# Patient Record
Sex: FEMALE | Race: BLACK OR AFRICAN AMERICAN | NOT HISPANIC OR LATINO
[De-identification: names, ages, dates, MRNs, and addresses within clinical notes are randomized per-mention and may not be internally consistent; named-entity substitution may affect disease eponyms.]

---

## 2017-04-12 ENCOUNTER — APPOINTMENT (OUTPATIENT)
Dept: PULMONOLOGY | Facility: CLINIC | Age: 77
End: 2017-04-12

## 2017-04-12 VITALS
SYSTOLIC BLOOD PRESSURE: 128 MMHG | HEIGHT: 64.5 IN | WEIGHT: 258 LBS | BODY MASS INDEX: 43.51 KG/M2 | DIASTOLIC BLOOD PRESSURE: 80 MMHG

## 2017-04-12 DIAGNOSIS — K43.9 VENTRAL HERNIA W/OUT OBSTRUCTION OR GANGRENE: ICD-10-CM

## 2017-04-12 RX ORDER — IPRATROPIUM BROMIDE AND ALBUTEROL SULFATE .5; 3 MG/3ML; MG/3ML
2.5-0.5 SOLUTION RESPIRATORY (INHALATION)
Refills: 0 | Status: DISCONTINUED | COMMUNITY
End: 2017-04-12

## 2017-05-17 ENCOUNTER — APPOINTMENT (OUTPATIENT)
Dept: PULMONOLOGY | Facility: CLINIC | Age: 77
End: 2017-05-17

## 2017-05-17 VITALS
DIASTOLIC BLOOD PRESSURE: 74 MMHG | WEIGHT: 260 LBS | BODY MASS INDEX: 43.94 KG/M2 | OXYGEN SATURATION: 93 % | SYSTOLIC BLOOD PRESSURE: 154 MMHG | HEART RATE: 90 BPM

## 2017-08-23 ENCOUNTER — APPOINTMENT (OUTPATIENT)
Dept: PULMONOLOGY | Facility: CLINIC | Age: 77
End: 2017-08-23
Payer: MEDICARE

## 2017-08-23 VITALS — WEIGHT: 262 LBS | BODY MASS INDEX: 44.28 KG/M2

## 2017-08-23 VITALS — HEART RATE: 84 BPM | DIASTOLIC BLOOD PRESSURE: 80 MMHG | SYSTOLIC BLOOD PRESSURE: 120 MMHG

## 2017-08-23 PROCEDURE — 94010 BREATHING CAPACITY TEST: CPT

## 2017-08-23 PROCEDURE — 99214 OFFICE O/P EST MOD 30 MIN: CPT | Mod: 25

## 2017-08-23 RX ORDER — FLUTICASONE FUROATE AND VILANTEROL TRIFENATATE 100; 25 UG/1; UG/1
100-25 POWDER RESPIRATORY (INHALATION) DAILY
Qty: 1 | Refills: 5 | Status: DISCONTINUED | COMMUNITY
Start: 2017-05-17 | End: 2017-08-23

## 2017-08-23 RX ORDER — LEVOFLOXACIN 500 MG/1
500 TABLET, FILM COATED ORAL
Qty: 10 | Refills: 0 | Status: DISCONTINUED | COMMUNITY
Start: 2017-02-22

## 2017-08-23 RX ORDER — PREDNISONE 20 MG/1
20 TABLET ORAL
Qty: 10 | Refills: 0 | Status: DISCONTINUED | COMMUNITY
Start: 2017-02-21

## 2017-11-20 ENCOUNTER — APPOINTMENT (OUTPATIENT)
Dept: PULMONOLOGY | Facility: CLINIC | Age: 77
End: 2017-11-20

## 2017-11-29 ENCOUNTER — APPOINTMENT (OUTPATIENT)
Dept: PULMONOLOGY | Facility: CLINIC | Age: 77
End: 2017-11-29
Payer: MEDICARE

## 2017-11-29 VITALS — BODY MASS INDEX: 43.6 KG/M2 | DIASTOLIC BLOOD PRESSURE: 80 MMHG | SYSTOLIC BLOOD PRESSURE: 126 MMHG | WEIGHT: 258 LBS

## 2017-11-29 PROCEDURE — 99214 OFFICE O/P EST MOD 30 MIN: CPT

## 2017-12-20 ENCOUNTER — APPOINTMENT (OUTPATIENT)
Dept: PULMONOLOGY | Facility: CLINIC | Age: 77
End: 2017-12-20
Payer: MEDICARE

## 2017-12-20 VITALS — WEIGHT: 258 LBS | BODY MASS INDEX: 43.6 KG/M2

## 2017-12-20 VITALS
DIASTOLIC BLOOD PRESSURE: 76 MMHG | BODY MASS INDEX: 43.6 KG/M2 | OXYGEN SATURATION: 98 % | HEART RATE: 76 BPM | HEIGHT: 64.5 IN | SYSTOLIC BLOOD PRESSURE: 130 MMHG

## 2017-12-20 PROCEDURE — 94010 BREATHING CAPACITY TEST: CPT

## 2017-12-20 PROCEDURE — 99214 OFFICE O/P EST MOD 30 MIN: CPT | Mod: 25

## 2018-04-20 ENCOUNTER — APPOINTMENT (OUTPATIENT)
Dept: PULMONOLOGY | Facility: CLINIC | Age: 78
End: 2018-04-20

## 2018-06-19 PROBLEM — M79.672 LEFT FOOT PAIN: Status: ACTIVE | Noted: 2018-06-19

## 2018-06-22 ENCOUNTER — APPOINTMENT (OUTPATIENT)
Dept: ORTHOPEDIC SURGERY | Facility: CLINIC | Age: 78
End: 2018-06-22
Payer: MEDICARE

## 2018-06-22 VITALS
HEART RATE: 74 BPM | BODY MASS INDEX: 40.47 KG/M2 | DIASTOLIC BLOOD PRESSURE: 77 MMHG | WEIGHT: 240 LBS | HEIGHT: 64.5 IN | SYSTOLIC BLOOD PRESSURE: 129 MMHG

## 2018-06-22 DIAGNOSIS — M79.672 PAIN IN LEFT FOOT: ICD-10-CM

## 2018-06-22 PROCEDURE — 20605 DRAIN/INJ JOINT/BURSA W/O US: CPT | Mod: LT

## 2018-06-22 PROCEDURE — 73610 X-RAY EXAM OF ANKLE: CPT | Mod: LT

## 2018-06-22 PROCEDURE — 73630 X-RAY EXAM OF FOOT: CPT | Mod: LT

## 2018-06-22 PROCEDURE — 99204 OFFICE O/P NEW MOD 45 MIN: CPT | Mod: 25

## 2018-07-20 ENCOUNTER — APPOINTMENT (OUTPATIENT)
Dept: ORTHOPEDIC SURGERY | Facility: CLINIC | Age: 78
End: 2018-07-20
Payer: MEDICARE

## 2018-07-20 VITALS
DIASTOLIC BLOOD PRESSURE: 81 MMHG | HEART RATE: 73 BPM | BODY MASS INDEX: 40.47 KG/M2 | WEIGHT: 240 LBS | SYSTOLIC BLOOD PRESSURE: 135 MMHG | HEIGHT: 64.5 IN

## 2018-07-20 PROCEDURE — 99213 OFFICE O/P EST LOW 20 MIN: CPT | Mod: 25

## 2018-07-20 PROCEDURE — 20605 DRAIN/INJ JOINT/BURSA W/O US: CPT | Mod: LT

## 2018-07-20 PROCEDURE — 73610 X-RAY EXAM OF ANKLE: CPT | Mod: LT

## 2018-08-03 ENCOUNTER — APPOINTMENT (OUTPATIENT)
Dept: ORTHOPEDIC SURGERY | Facility: CLINIC | Age: 78
End: 2018-08-03

## 2018-08-17 DIAGNOSIS — Z83.511 FAMILY HISTORY OF GLAUCOMA: ICD-10-CM

## 2018-08-17 DIAGNOSIS — Z82.49 FAMILY HISTORY OF ISCHEMIC HEART DISEASE AND OTHER DISEASES OF THE CIRCULATORY SYSTEM: ICD-10-CM

## 2018-08-17 DIAGNOSIS — Z63.4 DISAPPEARANCE AND DEATH OF FAMILY MEMBER: ICD-10-CM

## 2018-08-17 DIAGNOSIS — Z87.59 PERSONAL HISTORY OF OTHER COMPLICATIONS OF PREGNANCY, CHILDBIRTH AND THE PUERPERIUM: ICD-10-CM

## 2018-08-17 DIAGNOSIS — Z80.42 FAMILY HISTORY OF MALIGNANT NEOPLASM OF PROSTATE: ICD-10-CM

## 2018-08-17 DIAGNOSIS — Z80.0 FAMILY HISTORY OF MALIGNANT NEOPLASM OF DIGESTIVE ORGANS: ICD-10-CM

## 2018-08-17 DIAGNOSIS — Z78.9 OTHER SPECIFIED HEALTH STATUS: ICD-10-CM

## 2018-08-17 DIAGNOSIS — Z80.49 FAMILY HISTORY OF MALIGNANT NEOPLASM OF OTHER GENITAL ORGANS: ICD-10-CM

## 2018-08-17 SDOH — SOCIAL STABILITY - SOCIAL INSECURITY: DISSAPEARANCE AND DEATH OF FAMILY MEMBER: Z63.4

## 2018-08-24 PROBLEM — Z80.0 FAMILY HISTORY OF COLON CANCER: Status: ACTIVE | Noted: 2018-08-24

## 2018-08-24 PROBLEM — Z83.511 FAMILY HISTORY OF GLAUCOMA: Status: ACTIVE | Noted: 2018-08-24

## 2018-08-24 PROBLEM — Z82.49 FAMILY HISTORY OF HYPERTENSION: Status: ACTIVE | Noted: 2018-08-24

## 2018-08-24 PROBLEM — Z80.49 FAMILY HISTORY OF MALIGNANT NEOPLASM OF UTERUS: Status: ACTIVE | Noted: 2018-08-24

## 2018-08-24 PROBLEM — Z63.4 WIDOWED: Status: ACTIVE | Noted: 2018-08-24

## 2018-08-24 PROBLEM — Z80.0 FAMILY HISTORY OF MALIGNANT NEOPLASM OF STOMACH: Status: ACTIVE | Noted: 2018-08-24

## 2018-08-24 PROBLEM — Z80.42 FAMILY HISTORY OF MALIGNANT NEOPLASM OF PROSTATE: Status: ACTIVE | Noted: 2018-08-24

## 2018-08-24 PROBLEM — Z78.9 DOES NOT USE TOBACCO: Status: ACTIVE | Noted: 2018-08-24

## 2018-08-24 PROBLEM — Z87.59 HISTORY OF SPONTANEOUS ABORTION: Status: RESOLVED | Noted: 2018-08-24 | Resolved: 2018-08-24

## 2018-08-24 LAB — HBA1C MFR BLD: 6

## 2018-09-12 ENCOUNTER — APPOINTMENT (OUTPATIENT)
Dept: PULMONOLOGY | Facility: CLINIC | Age: 78
End: 2018-09-12
Payer: MEDICARE

## 2018-09-12 VITALS
HEIGHT: 64.5 IN | SYSTOLIC BLOOD PRESSURE: 140 MMHG | DIASTOLIC BLOOD PRESSURE: 86 MMHG | OXYGEN SATURATION: 95 % | HEART RATE: 78 BPM | BODY MASS INDEX: 41.82 KG/M2 | WEIGHT: 248 LBS

## 2018-09-12 DIAGNOSIS — R06.2 WHEEZING: ICD-10-CM

## 2018-09-12 PROCEDURE — 99214 OFFICE O/P EST MOD 30 MIN: CPT

## 2018-09-13 ENCOUNTER — APPOINTMENT (OUTPATIENT)
Dept: ENDOCRINOLOGY | Facility: CLINIC | Age: 78
End: 2018-09-13
Payer: MEDICARE

## 2018-09-13 VITALS
HEART RATE: 83 BPM | SYSTOLIC BLOOD PRESSURE: 130 MMHG | HEIGHT: 64.5 IN | WEIGHT: 243 LBS | DIASTOLIC BLOOD PRESSURE: 80 MMHG | BODY MASS INDEX: 40.98 KG/M2

## 2018-09-13 DIAGNOSIS — H40.9 UNSPECIFIED GLAUCOMA: ICD-10-CM

## 2018-09-13 LAB
CHOLEST SERPL-MCNC: 192
GLUCOSE BLDC GLUCOMTR-MCNC: 124
HBA1C MFR BLD HPLC: 6.6
HDLC SERPL-MCNC: 67
LDLC SERPL CALC-MCNC: 113
MICROALBUMIN/CREAT 24H UR-RTO: 0
TRIGL SERPL-MCNC: 61

## 2018-09-13 PROCEDURE — 82962 GLUCOSE BLOOD TEST: CPT

## 2018-09-13 PROCEDURE — 99213 OFFICE O/P EST LOW 20 MIN: CPT | Mod: 25

## 2018-09-13 RX ORDER — PANTOPRAZOLE 40 MG/1
40 TABLET, DELAYED RELEASE ORAL
Refills: 0 | Status: DISCONTINUED | COMMUNITY
End: 2018-09-13

## 2018-09-13 RX ORDER — METHYLPREDNISOLONE 4 MG/1
4 TABLET ORAL
Qty: 21 | Refills: 0 | Status: DISCONTINUED | COMMUNITY
Start: 2017-11-29 | End: 2018-09-13

## 2018-09-14 ENCOUNTER — APPOINTMENT (OUTPATIENT)
Dept: PULMONOLOGY | Facility: CLINIC | Age: 78
End: 2018-09-14

## 2018-09-18 ENCOUNTER — FORM ENCOUNTER (OUTPATIENT)
Age: 78
End: 2018-09-18

## 2018-09-19 ENCOUNTER — OUTPATIENT (OUTPATIENT)
Dept: OUTPATIENT SERVICES | Facility: HOSPITAL | Age: 78
LOS: 1 days | End: 2018-09-19

## 2018-09-19 ENCOUNTER — APPOINTMENT (OUTPATIENT)
Dept: MRI IMAGING | Facility: CLINIC | Age: 78
End: 2018-09-19
Payer: MEDICARE

## 2018-09-19 DIAGNOSIS — E27.9 DISORDER OF ADRENAL GLAND, UNSPECIFIED: ICD-10-CM

## 2018-09-19 DIAGNOSIS — Z98.84 BARIATRIC SURGERY STATUS: Chronic | ICD-10-CM

## 2018-09-19 PROBLEM — H40.9 GLAUCOMA: Status: ACTIVE | Noted: 2018-09-19

## 2018-09-19 PROCEDURE — 74183 MRI ABD W/O CNTR FLWD CNTR: CPT | Mod: 26

## 2018-09-19 RX ORDER — CELECOXIB 200 MG/1
200 CAPSULE ORAL
Refills: 0 | Status: DISCONTINUED | COMMUNITY
End: 2018-09-19

## 2018-09-20 ENCOUNTER — MEDICATION RENEWAL (OUTPATIENT)
Age: 78
End: 2018-09-20

## 2018-10-23 ENCOUNTER — OUTPATIENT (OUTPATIENT)
Dept: OUTPATIENT SERVICES | Facility: HOSPITAL | Age: 78
LOS: 1 days | End: 2018-10-23
Payer: MEDICARE

## 2018-10-23 DIAGNOSIS — R49.0 DYSPHONIA: ICD-10-CM

## 2018-10-23 DIAGNOSIS — Z51.89 ENCOUNTER FOR OTHER SPECIFIED AFTERCARE: ICD-10-CM

## 2018-10-23 DIAGNOSIS — Z98.84 BARIATRIC SURGERY STATUS: Chronic | ICD-10-CM

## 2018-10-23 PROCEDURE — G9171: CPT | Mod: CH

## 2018-10-23 PROCEDURE — G9172: CPT | Mod: CH

## 2018-10-23 PROCEDURE — 92523 SPEECH SOUND LANG COMPREHEN: CPT | Mod: KX

## 2018-11-13 ENCOUNTER — OTHER (OUTPATIENT)
Age: 78
End: 2018-11-13

## 2018-11-13 LAB
ALDOSTERONE SERUM: 7.1 NG/DL
RENIN ACTIVITY, PLASMA: <0.167 NG/ML/HR

## 2018-12-18 RX ORDER — OMEPRAZOLE 40 MG/1
40 CAPSULE, DELAYED RELEASE ORAL
Qty: 90 | Refills: 3 | Status: DISCONTINUED | COMMUNITY
Start: 2017-04-12 | End: 2018-12-18

## 2018-12-21 LAB
ALBUMIN SERPL ELPH-MCNC: 4.2 G/DL
ALP BLD-CCNC: 94 U/L
ALT SERPL-CCNC: 13 U/L
ANION GAP SERPL CALC-SCNC: 13 MMOL/L
AST SERPL-CCNC: 17 U/L
BASOPHILS # BLD AUTO: 0.03 K/UL
BASOPHILS NFR BLD AUTO: 0.4 %
BILIRUB SERPL-MCNC: 0.6 MG/DL
BUN SERPL-MCNC: 16 MG/DL
CALCIUM SERPL-MCNC: 9.4 MG/DL
CHLORIDE SERPL-SCNC: 105 MMOL/L
CHOLEST SERPL-MCNC: 192 MG/DL
CHOLEST/HDLC SERPL: 2.4 RATIO
CO2 SERPL-SCNC: 26 MMOL/L
CREAT SERPL-MCNC: 0.67 MG/DL
EOSINOPHIL # BLD AUTO: 0.25 K/UL
EOSINOPHIL NFR BLD AUTO: 3.5 %
GLUCOSE SERPL-MCNC: 116 MG/DL
HBA1C MFR BLD HPLC: 6.5 %
HCT VFR BLD CALC: 39.8 %
HDLC SERPL-MCNC: 79 MG/DL
HGB BLD-MCNC: 12.3 G/DL
IMM GRANULOCYTES NFR BLD AUTO: 0.3 %
LDLC SERPL CALC-MCNC: 98 MG/DL
LYMPHOCYTES # BLD AUTO: 3.13 K/UL
LYMPHOCYTES NFR BLD AUTO: 43.4 %
MAN DIFF?: NORMAL
MCHC RBC-ENTMCNC: 29 PG
MCHC RBC-ENTMCNC: 30.9 GM/DL
MCV RBC AUTO: 93.9 FL
MONOCYTES # BLD AUTO: 0.5 K/UL
MONOCYTES NFR BLD AUTO: 6.9 %
NEUTROPHILS # BLD AUTO: 3.28 K/UL
NEUTROPHILS NFR BLD AUTO: 45.5 %
PLATELET # BLD AUTO: 275 K/UL
POTASSIUM SERPL-SCNC: 4.4 MMOL/L
PROT SERPL-MCNC: 6.7 G/DL
RBC # BLD: 4.24 M/UL
RBC # FLD: 15.7 %
SODIUM SERPL-SCNC: 144 MMOL/L
TRIGL SERPL-MCNC: 73 MG/DL
WBC # FLD AUTO: 7.21 K/UL

## 2019-01-25 ENCOUNTER — APPOINTMENT (OUTPATIENT)
Dept: ORTHOPEDIC SURGERY | Facility: CLINIC | Age: 79
End: 2019-01-25

## 2019-02-13 ENCOUNTER — APPOINTMENT (OUTPATIENT)
Dept: ENDOCRINOLOGY | Facility: CLINIC | Age: 79
End: 2019-02-13
Payer: MEDICARE

## 2019-02-13 VITALS
HEIGHT: 64.5 IN | SYSTOLIC BLOOD PRESSURE: 132 MMHG | DIASTOLIC BLOOD PRESSURE: 70 MMHG | BODY MASS INDEX: 40.14 KG/M2 | WEIGHT: 238 LBS | HEART RATE: 72 BPM

## 2019-02-13 LAB — GLUCOSE BLDC GLUCOMTR-MCNC: 148

## 2019-02-13 PROCEDURE — 99214 OFFICE O/P EST MOD 30 MIN: CPT | Mod: 25

## 2019-02-13 PROCEDURE — 82962 GLUCOSE BLOOD TEST: CPT

## 2019-02-13 RX ORDER — BLOOD-GLUCOSE METER
W/DEVICE KIT MISCELLANEOUS
Qty: 1 | Refills: 0 | Status: ACTIVE | COMMUNITY
Start: 2019-02-13 | End: 1900-01-01

## 2019-03-15 ENCOUNTER — APPOINTMENT (OUTPATIENT)
Dept: PULMONOLOGY | Facility: CLINIC | Age: 79
End: 2019-03-15

## 2019-03-27 ENCOUNTER — APPOINTMENT (OUTPATIENT)
Dept: ENDOCRINOLOGY | Facility: CLINIC | Age: 79
End: 2019-03-27

## 2019-05-01 RX ORDER — RABEPRAZOLE SODIUM 20 MG/1
20 TABLET, DELAYED RELEASE ORAL DAILY
Qty: 90 | Refills: 1 | Status: DISCONTINUED | COMMUNITY
Start: 2018-12-18 | End: 2019-05-01

## 2019-05-06 ENCOUNTER — APPOINTMENT (OUTPATIENT)
Dept: ENDOCRINOLOGY | Facility: CLINIC | Age: 79
End: 2019-05-06
Payer: MEDICARE

## 2019-05-06 PROCEDURE — 36415 COLL VENOUS BLD VENIPUNCTURE: CPT

## 2019-05-09 ENCOUNTER — RECORD ABSTRACTING (OUTPATIENT)
Age: 79
End: 2019-05-09

## 2019-05-09 LAB
ALBUMIN SERPL ELPH-MCNC: 4 G/DL
ALP BLD-CCNC: 84 U/L
ALT SERPL-CCNC: 10 U/L
ANION GAP SERPL CALC-SCNC: 13 MMOL/L
AST SERPL-CCNC: 16 U/L
BILIRUB SERPL-MCNC: 0.4 MG/DL
BUN SERPL-MCNC: 16 MG/DL
CALCIUM SERPL-MCNC: 9.6 MG/DL
CHLORIDE SERPL-SCNC: 104 MMOL/L
CHOLEST SERPL-MCNC: 178 MG/DL
CHOLEST/HDLC SERPL: 3 RATIO
CO2 SERPL-SCNC: 26 MMOL/L
CREAT SERPL-MCNC: 0.76 MG/DL
GLUCOSE SERPL-MCNC: 105 MG/DL
HBA1C MFR BLD HPLC: 6.4 %
HDLC SERPL-MCNC: 60 MG/DL
LDLC SERPL CALC-MCNC: 105 MG/DL
POTASSIUM SERPL-SCNC: 4.3 MMOL/L
PROT SERPL-MCNC: 6.6 G/DL
SODIUM SERPL-SCNC: 143 MMOL/L
TRIGL SERPL-MCNC: 63 MG/DL

## 2019-05-29 ENCOUNTER — APPOINTMENT (OUTPATIENT)
Dept: ENDOCRINOLOGY | Facility: CLINIC | Age: 79
End: 2019-05-29
Payer: MEDICARE

## 2019-05-29 VITALS
BODY MASS INDEX: 39.12 KG/M2 | SYSTOLIC BLOOD PRESSURE: 124 MMHG | DIASTOLIC BLOOD PRESSURE: 80 MMHG | HEIGHT: 64.5 IN | WEIGHT: 232 LBS | HEART RATE: 63 BPM

## 2019-05-29 DIAGNOSIS — E27.9 DISORDER OF ADRENAL GLAND, UNSPECIFIED: ICD-10-CM

## 2019-05-29 DIAGNOSIS — R73.03 PREDIABETES.: ICD-10-CM

## 2019-05-29 DIAGNOSIS — R73.09 OTHER ABNORMAL GLUCOSE: ICD-10-CM

## 2019-05-29 LAB — GLUCOSE BLDC GLUCOMTR-MCNC: 127

## 2019-05-29 PROCEDURE — 82962 GLUCOSE BLOOD TEST: CPT

## 2019-05-29 PROCEDURE — 99213 OFFICE O/P EST LOW 20 MIN: CPT | Mod: 25

## 2019-06-12 ENCOUNTER — APPOINTMENT (OUTPATIENT)
Dept: NEUROLOGY | Facility: CLINIC | Age: 79
End: 2019-06-12
Payer: MEDICARE

## 2019-06-12 VITALS
HEIGHT: 68 IN | BODY MASS INDEX: 34.71 KG/M2 | SYSTOLIC BLOOD PRESSURE: 123 MMHG | DIASTOLIC BLOOD PRESSURE: 80 MMHG | WEIGHT: 229 LBS

## 2019-06-12 DIAGNOSIS — R55 SYNCOPE AND COLLAPSE: ICD-10-CM

## 2019-06-12 DIAGNOSIS — R56.9 UNSPECIFIED CONVULSIONS: ICD-10-CM

## 2019-06-12 DIAGNOSIS — R42 DIZZINESS AND GIDDINESS: ICD-10-CM

## 2019-06-12 PROCEDURE — 99204 OFFICE O/P NEW MOD 45 MIN: CPT

## 2019-06-12 NOTE — REASON FOR VISIT
[Consultation] : a consultation visit [FreeTextEntry1] : Chief complaint: Lightheadedness, Dr. MICHELLE Liu

## 2019-06-12 NOTE — CONSULT LETTER
[Dear  ___] : Dear  [unfilled], [Consult Letter:] : I had the pleasure of evaluating your patient, [unfilled]. [Sincerely,] : Sincerely, [FreeTextEntry3] : Shiv Zelaya MD, PhD, DPN-N\par [facility]\par Neurology at [place]\par Chief, Division of Neurology\par [facility]\par  [Please see my note below.] : Please see my note below.

## 2019-06-12 NOTE — HISTORY OF PRESENT ILLNESS
[FreeTextEntry1] : She has been following with a another neurologist. She says [place] in [place] to travel. She had some sort of blackouts in the 1990s. She was diagnosed with seizures. She was on medication for a while but has been off medication for many years and says she has not had any of those spells. She has also had episodes of vertigo since 2000. Over the last 1-2 months she developed a new sensation. She reports a hot feeling in her head. She would feel weak and lightheaded. She says she gets it daily. She does follow with her cardiologist and she says everything has been fine in that regard.\par \par Says she has had negative brain MRIs in the past.\par \par Medical history otherwise significant for diabetes

## 2019-06-12 NOTE — ASSESSMENT
[FreeTextEntry1] : Her neurological examination is unremarkable. She is describing mainly a presyncopal-type of feeling. She says cardiac evaluation has been unremarkable. We are requested the records from her prior treating neurologist. Will also obtain an EEG and cerebrovascular flow studies with further discussions to follow.

## 2019-06-12 NOTE — PHYSICAL EXAM
[General Appearance - Alert] : alert [General Appearance - Well-Appearing] : healthy appearing [Oriented To Time, Place, And Person] : oriented to person, place, and time [General Appearance - In No Acute Distress] : in no acute distress [Impaired Insight] : insight and judgment were intact [Affect] : the affect was normal [Memory Recent] : recent memory was not impaired [Place] : oriented to place [Person] : oriented to person [Concentration Intact] : normal concentrating ability [Time] : oriented to time [Comprehension] : comprehension intact [Fluency] : fluency intact [Cranial Nerves Optic (II)] : visual acuity intact bilaterally,  visual fields full to confrontation, pupils equal round and reactive to light [Cranial Nerves Facial (VII)] : face symmetrical [Cranial Nerves Trigeminal (V)] : facial sensation intact symmetrically [Cranial Nerves Oculomotor (III)] : extraocular motion intact [Cranial Nerves Vestibulocochlear (VIII)] : hearing was intact bilaterally [Cranial Nerves Accessory (XI - Cranial And Spinal)] : head turning and shoulder shrug symmetric [Cranial Nerves Glossopharyngeal (IX)] : tongue and palate midline [Cranial Nerves Hypoglossal (XII)] : there was no tongue deviation with protrusion [Motor Strength] : muscle strength was normal in all four extremities [Motor Tone] : muscle tone was normal in all four extremities [Sensation Pain / Temperature Decrease] : pain and temperature was intact [Sensation Tactile Decrease] : light touch was intact [No Muscle Atrophy] : normal bulk in all four extremities [Balance] : balance was intact [2+] : Ankle jerk left 2+ [Arterial Pulses Carotid] : carotid pulses were normal with no bruits [Edema] : there was no peripheral edema [FreeTextEntry1] : hallpike maneuver neg [Past-pointing] : there was no past-pointing [Tremor] : no tremor present [Coordination - Dysmetria Impaired Finger-to-Nose Bilateral] : not present [Coordination - Dysmetria Impaired Heel-to-Shin Bilateral] : not present [Plantar Reflex Right Only] : normal on the right [Plantar Reflex Left Only] : normal on the left [FreeTextEntry8] : Ambulates with a cane due to chronic ankle pain. [FreeTextEntry5] : No nystagmus

## 2019-06-18 ENCOUNTER — APPOINTMENT (OUTPATIENT)
Dept: NEUROLOGY | Facility: CLINIC | Age: 79
End: 2019-06-18

## 2019-07-08 ENCOUNTER — LABORATORY RESULT (OUTPATIENT)
Age: 79
End: 2019-07-08

## 2019-08-01 ENCOUNTER — LABORATORY RESULT (OUTPATIENT)
Age: 79
End: 2019-08-01

## 2019-08-05 ENCOUNTER — RX RENEWAL (OUTPATIENT)
Age: 79
End: 2019-08-05

## 2019-08-05 PROBLEM — R56.9 SEIZURES: Status: ACTIVE | Noted: 2019-06-12

## 2019-08-20 ENCOUNTER — APPOINTMENT (OUTPATIENT)
Dept: NEUROLOGY | Facility: CLINIC | Age: 79
End: 2019-08-20

## 2019-09-17 ENCOUNTER — APPOINTMENT (OUTPATIENT)
Dept: ENDOCRINOLOGY | Facility: CLINIC | Age: 79
End: 2019-09-17
Payer: MEDICARE

## 2019-09-17 VITALS
WEIGHT: 223 LBS | BODY MASS INDEX: 33.8 KG/M2 | DIASTOLIC BLOOD PRESSURE: 70 MMHG | OXYGEN SATURATION: 98 % | SYSTOLIC BLOOD PRESSURE: 118 MMHG | HEIGHT: 68 IN | HEART RATE: 71 BPM

## 2019-09-17 DIAGNOSIS — Z79.899 OTHER LONG TERM (CURRENT) DRUG THERAPY: ICD-10-CM

## 2019-09-17 LAB — GLUCOSE BLDC GLUCOMTR-MCNC: 95

## 2019-09-17 PROCEDURE — 82962 GLUCOSE BLOOD TEST: CPT

## 2019-09-17 PROCEDURE — 36415 COLL VENOUS BLD VENIPUNCTURE: CPT

## 2019-09-17 PROCEDURE — 99214 OFFICE O/P EST MOD 30 MIN: CPT | Mod: 25

## 2019-09-17 RX ORDER — ALBUTEROL SULFATE 2.5 MG/3ML
(2.5 MG/3ML) SOLUTION RESPIRATORY (INHALATION)
Qty: 1 | Refills: 5 | Status: DISCONTINUED | COMMUNITY
Start: 2017-04-12 | End: 2019-09-17

## 2019-09-17 RX ORDER — CA/D3/MAG OX/ZINC/COP/MANG/BOR 600 MG-800
250 MCG TABLET,CHEWABLE ORAL
Refills: 0 | Status: DISCONTINUED | COMMUNITY
End: 2019-09-17

## 2019-09-17 RX ORDER — MONTELUKAST SODIUM 10 MG/1
10 TABLET, FILM COATED ORAL
Refills: 0 | Status: DISCONTINUED | COMMUNITY
End: 2019-09-17

## 2019-09-17 RX ORDER — CELECOXIB 200 MG/1
200 CAPSULE ORAL
Refills: 0 | Status: DISCONTINUED | COMMUNITY
End: 2019-09-17

## 2019-09-17 RX ORDER — BUDESONIDE AND FORMOTEROL FUMARATE DIHYDRATE 160; 4.5 UG/1; UG/1
160-4.5 AEROSOL RESPIRATORY (INHALATION) TWICE DAILY
Qty: 1 | Refills: 5 | Status: DISCONTINUED | COMMUNITY
Start: 2017-08-23 | End: 2019-09-17

## 2019-09-17 RX ORDER — PANTOPRAZOLE 40 MG/1
40 TABLET, DELAYED RELEASE ORAL
Refills: 0 | Status: DISCONTINUED | COMMUNITY
End: 2019-09-17

## 2019-09-17 RX ORDER — IPRATROPIUM BROMIDE 0.5 MG/2.5ML
0.02 SOLUTION RESPIRATORY (INHALATION)
Qty: 75 | Refills: 0 | Status: DISCONTINUED | COMMUNITY
Start: 2017-02-22 | End: 2019-09-17

## 2019-09-17 NOTE — PHYSICAL EXAM
[Alert] : alert [No Acute Distress] : no acute distress [Well Nourished] : well nourished [Well Developed] : well developed [PERRL] : pupils equal, round and reactive to light [EOMI] : extra ocular movement intact [Normal Rate and Effort] : normal respiratory rhythm and effort [Clear to Auscultation] : lungs were clear to auscultation bilaterally [Normal Rate] : heart rate was normal  [Normal S1, S2] : normal S1 and S2 [Normal Bowel Sounds] : normal bowel sounds [Not Tender] : non-tender [Soft] : abdomen soft [Normal Gait] : normal gait [Not Distended] : not distended [No Clubbing, Cyanosis] : no clubbing  or cyanosis of the fingernails [Cranial Nerves Intact] : cranial nerves 2-12 were intact [Normal Reflexes] : deep tendon reflexes were 2+ and symmetric [No Tremors] : no tremors [Oriented x3] : oriented to person, place, and time [Normal Insight/Judgement] : insight and judgment were intact [Normal Affect] : the affect was normal [Normal Mood] : the mood was normal [de-identified] : obese appearance

## 2019-09-17 NOTE — DATA REVIEWED
[FreeTextEntry1] : Labs 6/26/18\par Benja 3.4, \par normal plasma metanephrine\par Renin <0.167\par \par MRI 9/19/18 Left adrenal adenoma 2.2 cm, stable

## 2019-09-17 NOTE — REVIEW OF SYSTEMS
[Blurry Vision] : blurred vision [SOB on Exertion] : shortness of breath during exertion [Nausea] : nausea [Vomiting] : vomiting was observed [Constipation] : constipation [Diarrhea] : diarrhea [Abdominal Pain] : abdominal pain [Nocturia] : nocturia [As Noted in HPI] : as noted in HPI [Chest Pain] : no chest pain [Shortness Of Breath] : no shortness of breath [Polyuria] : no polyuria [Depression] : no depression [Anxiety] : no anxiety [Polydipsia] : no polydipsia [FreeTextEntry2] : weight fluctuates [FreeTextEntry6] : due to obesity [de-identified] : dry mouth

## 2019-09-17 NOTE — HISTORY OF PRESENT ILLNESS
[FreeTextEntry1] : T2DM DX 2018 on blood test 2 hbA1c >6.5\par SMBG - testing 1x daily, per pt FS fasting 80-90's\par Diet - trying to watch diet, no fried foods\par DM meds:  mg 1tab in am and 2 tabs in evening\par Exercise - limited\par \par old records from Dr August - adrenal adenoma not hormonally active. MRI 3/2015 stable left adrenal adenoma\par she denies episodic symptoms of headache, palpitations and sweating\par she does not have high blood pressure\par \par

## 2019-09-17 NOTE — ASSESSMENT
[FreeTextEntry1] : 1. asymptomatic, stable Left adrenal nodule -h/o suppressed renin level but, no h/o HTN and potassium normal. most recent blood  test PAC/PRA 2.5. normal plasma metanephrines, normal late evening salivary cortisol levels\par - no further testing at this time\par \par 2. T2DM - 2 A1c >6.5, controlled by history\par - cont MFN, cont glucose checks\par - cont diet\par - repeat labs today\par - eye exam with ophthalmology

## 2019-09-18 ENCOUNTER — RESULT REVIEW (OUTPATIENT)
Age: 79
End: 2019-09-18

## 2019-09-18 LAB
ALBUMIN SERPL ELPH-MCNC: 4.3 G/DL
ALP BLD-CCNC: 82 U/L
ALT SERPL-CCNC: 13 U/L
ANION GAP SERPL CALC-SCNC: 10 MMOL/L
AST SERPL-CCNC: 20 U/L
BASOPHILS # BLD AUTO: 0.02 K/UL
BASOPHILS NFR BLD AUTO: 0.3 %
BILIRUB SERPL-MCNC: 0.4 MG/DL
BUN SERPL-MCNC: 17 MG/DL
CALCIUM SERPL-MCNC: 9.7 MG/DL
CHLORIDE SERPL-SCNC: 105 MMOL/L
CHOLEST SERPL-MCNC: 195 MG/DL
CHOLEST/HDLC SERPL: 2.7 RATIO
CO2 SERPL-SCNC: 27 MMOL/L
CREAT SERPL-MCNC: 0.7 MG/DL
EOSINOPHIL # BLD AUTO: 0.09 K/UL
EOSINOPHIL NFR BLD AUTO: 1.5 %
ESTIMATED AVERAGE GLUCOSE: 128 MG/DL
GLUCOSE SERPL-MCNC: 90 MG/DL
HBA1C MFR BLD HPLC: 6.1 %
HCT VFR BLD CALC: 38.2 %
HDLC SERPL-MCNC: 73 MG/DL
HGB BLD-MCNC: 11.9 G/DL
IMM GRANULOCYTES NFR BLD AUTO: 0.5 %
LDLC SERPL CALC-MCNC: 108 MG/DL
LYMPHOCYTES # BLD AUTO: 2.06 K/UL
LYMPHOCYTES NFR BLD AUTO: 33.7 %
MAN DIFF?: NORMAL
MCHC RBC-ENTMCNC: 29 PG
MCHC RBC-ENTMCNC: 31.2 GM/DL
MCV RBC AUTO: 93.2 FL
MONOCYTES # BLD AUTO: 0.32 K/UL
MONOCYTES NFR BLD AUTO: 5.2 %
NEUTROPHILS # BLD AUTO: 3.59 K/UL
NEUTROPHILS NFR BLD AUTO: 58.8 %
PLATELET # BLD AUTO: 271 K/UL
POTASSIUM SERPL-SCNC: 4.7 MMOL/L
PROT SERPL-MCNC: 6.7 G/DL
RBC # BLD: 4.1 M/UL
RBC # FLD: 15.3 %
SODIUM SERPL-SCNC: 142 MMOL/L
TRIGL SERPL-MCNC: 72 MG/DL
TSH SERPL-ACNC: 1.22 UIU/ML
WBC # FLD AUTO: 6.11 K/UL

## 2019-09-27 ENCOUNTER — RX RENEWAL (OUTPATIENT)
Age: 79
End: 2019-09-27

## 2019-12-12 LAB — MICROALBUMIN/CREAT 24H UR-RTO: 0

## 2019-12-13 ENCOUNTER — APPOINTMENT (OUTPATIENT)
Dept: ENDOCRINOLOGY | Facility: CLINIC | Age: 79
End: 2019-12-13
Payer: MEDICARE

## 2019-12-13 ENCOUNTER — RESULT CHARGE (OUTPATIENT)
Age: 79
End: 2019-12-13

## 2019-12-13 VITALS
DIASTOLIC BLOOD PRESSURE: 84 MMHG | HEIGHT: 68 IN | BODY MASS INDEX: 34.1 KG/M2 | WEIGHT: 225 LBS | HEART RATE: 86 BPM | SYSTOLIC BLOOD PRESSURE: 130 MMHG

## 2019-12-13 LAB — GLUCOSE BLDC GLUCOMTR-MCNC: 126

## 2019-12-13 PROCEDURE — 82962 GLUCOSE BLOOD TEST: CPT

## 2019-12-13 PROCEDURE — 99213 OFFICE O/P EST LOW 20 MIN: CPT | Mod: 25

## 2019-12-13 RX ORDER — LATANOPROST/PF 0.005 %
0.01 DROPS OPHTHALMIC (EYE) AS DIRECTED
Refills: 0 | Status: ACTIVE | COMMUNITY

## 2019-12-13 RX ORDER — DORZOLAMIDE HYDROCHLORIDE 20 MG/ML
2 SOLUTION OPHTHALMIC AS DIRECTED
Refills: 0 | Status: ACTIVE | COMMUNITY

## 2019-12-13 NOTE — ASSESSMENT
[FreeTextEntry1] : 1. asymptomatic, stable Left adrenal nodule -h/o suppressed renin level but, no h/o HTN and potassium normal. most recent blood  test PAC/PRA 2.5. normal plasma metanephrines, normal late evening salivary cortisol levels\par - no further blood testing at this time\par - pt had recent abd CT scan, will get results\par \par 2. T2DM - 2 A1c >6.5, controlled by history\par - cont MFN, cont glucose checks\par - cont diet\par - updated labs needed, if A1c still <6.5 - serafin reduce MFN\par - eye exam with ophthalmology

## 2019-12-13 NOTE — HISTORY OF PRESENT ILLNESS
[FreeTextEntry1] : has skin cancer, may be moving to [place]\par \par T2DM DX 2018 on blood test 2 hbA1c >6.5\par SMBG - testing 1x daily, per pt FS fasting 80-90's\par Diet - trying to watch diet, no fried foods\par DM meds:  mg 1 tab in am and 2 tabs in evening\par Exercise - limited\par \par old records from Dr August - adrenal adenoma not hormonally active. MRI 3/2015 and 2018 stable left adrenal adenoma\par she denies episodic symptoms of headache, palpitations and sweating\par she does not have high blood pressure\par \par

## 2019-12-13 NOTE — PHYSICAL EXAM
[Alert] : alert [Well Nourished] : well nourished [Well Developed] : well developed [No Acute Distress] : no acute distress [PERRL] : pupils equal, round and reactive to light [EOMI] : extra ocular movement intact [Clear to Auscultation] : lungs were clear to auscultation bilaterally [Normal Rate] : heart rate was normal  [Normal Rate and Effort] : normal respiratory rhythm and effort [Not Tender] : non-tender [Normal Bowel Sounds] : normal bowel sounds [Normal S1, S2] : normal S1 and S2 [Soft] : abdomen soft [Not Distended] : not distended [Cranial Nerves Intact] : cranial nerves 2-12 were intact [Normal Reflexes] : deep tendon reflexes were 2+ and symmetric [No Clubbing, Cyanosis] : no clubbing  or cyanosis of the fingernails [Normal Gait] : normal gait [Oriented x3] : oriented to person, place, and time [No Tremors] : no tremors [Normal Insight/Judgement] : insight and judgment were intact [Normal Affect] : the affect was normal [Normal Mood] : the mood was normal [de-identified] : obese appearance

## 2019-12-13 NOTE — REVIEW OF SYSTEMS
[SOB on Exertion] : shortness of breath during exertion [Constipation] : constipation [Abdominal Pain] : abdominal pain [As Noted in HPI] : as noted in HPI [Nocturia] : nocturia [Fatigue] : no fatigue [Shortness Of Breath] : no shortness of breath [Chest Pain] : no chest pain [Blurry Vision] : no blurred vision [Depression] : no depression [Polyuria] : no polyuria [Polydipsia] : no polydipsia [Anxiety] : no anxiety [FreeTextEntry6] : due to obesity [de-identified] : dry mouth [FreeTextEntry2] : weight fluctuates

## 2020-03-13 ENCOUNTER — APPOINTMENT (OUTPATIENT)
Dept: ORTHOPEDIC SURGERY | Facility: CLINIC | Age: 80
End: 2020-03-13

## 2020-04-17 RX ORDER — ALBUTEROL SULFATE 2.5 MG/3ML
(2.5 MG/3ML) SOLUTION RESPIRATORY (INHALATION)
Qty: 1 | Refills: 5 | Status: ACTIVE | COMMUNITY
Start: 2020-04-17 | End: 1900-01-01

## 2020-05-07 ENCOUNTER — APPOINTMENT (OUTPATIENT)
Dept: ENDOCRINOLOGY | Facility: CLINIC | Age: 80
End: 2020-05-07

## 2020-05-07 ENCOUNTER — APPOINTMENT (OUTPATIENT)
Dept: ENDOCRINOLOGY | Facility: CLINIC | Age: 80
End: 2020-05-07
Payer: MEDICARE

## 2020-05-07 PROCEDURE — 99441: CPT | Mod: CR

## 2020-05-07 NOTE — REASON FOR VISIT
[Follow - Up] : a follow-up visit [Adrenal Evaluation/Adrenal Disorder] : adrenal evaluation/adrenal disorder [DM Type 2] : DM Type 2

## 2020-05-07 NOTE — ASSESSMENT
[FreeTextEntry1] : 1. asymptomatic, stable Left adrenal nodule -h/o suppressed renin level but, no h/o HTN and potassium normal. most recent blood  test PAC/PRA 2.5. normal plasma metanephrines, normal late evening salivary cortisol levels\par - no further blood testing at this time\par \par 2. T2DM - 2 A1c >6.5, controlled by history now with low sugars\par - stop MFN\par - cont diet\par - repeat labs needed, will send [place] to house for lab draw

## 2020-05-07 NOTE — DATA REVIEWED
[FreeTextEntry1] : Labs 6/26/18\par Benja 3.4, \par normal plasma metanephrine\par Renin <0.167\par \par MRI 9/19/18 Left adrenal adenoma 2.2 cm, stable\par Abd CT 10/24/19 stable Left adrenal nodule 18x20 cm

## 2020-05-07 NOTE — HISTORY OF PRESENT ILLNESS
[Verbal consent obtained from patient] : the patient, [unfilled] [FreeTextEntry1] : Interval hx - breathing issues which she thinks may be due to anxiety\par \par T2DM DX 2018 on blood test 2 hbA1c >6.5\par SMBG - testing 1x daily, per pt feeling woozy and FS 72, this am FS 71\par Diet - trying to watch diet, no fried foods\par DM meds:  mg 1 tab in am and 2 tabs in evening\par Exercise - limited\par \par old records from Dr August - adrenal adenoma not hormonally active. MRI 3/2015 and 2018 stable left adrenal adenoma\par she denies episodic symptoms of headache, palpitations and sweating. she does not have high blood pressure\par \par

## 2020-05-07 NOTE — REVIEW OF SYSTEMS
[Fatigue] : fatigue [Recent Weight Gain (___ Lbs)] : no recent weight gain [Recent Weight Loss (___ Lbs)] : no recent weight loss [Chest Pain] : no chest pain [Palpitations] : no palpitations [Shortness Of Breath] : shortness of breath [SOB on Exertion] : shortness of breath on exertion [Nausea] : no nausea [Constipation] : constipation [Abdominal Pain] : no abdominal pain [Vomiting] : no vomiting [Polyuria] : no polyuria [Nocturia] : no nocturia [Joint Pain] : no joint pain [Myalgia] : no myalgia  [Anxiety] : anxiety [Stress] : stress [Polydipsia] : no polydipsia [de-identified] : due to current pandemic

## 2020-06-02 RX ORDER — LANCETS 28 GAUGE
EACH MISCELLANEOUS
Qty: 1 | Refills: 1 | Status: ACTIVE | COMMUNITY
Start: 2020-06-01 | End: 1900-01-01

## 2020-08-07 ENCOUNTER — APPOINTMENT (OUTPATIENT)
Dept: ENDOCRINOLOGY | Facility: CLINIC | Age: 80
End: 2020-08-07

## 2020-08-10 ENCOUNTER — RX RENEWAL (OUTPATIENT)
Age: 80
End: 2020-08-10

## 2020-08-12 ENCOUNTER — APPOINTMENT (OUTPATIENT)
Dept: PULMONOLOGY | Facility: CLINIC | Age: 80
End: 2020-08-12
Payer: MEDICARE

## 2020-08-12 ENCOUNTER — LABORATORY RESULT (OUTPATIENT)
Age: 80
End: 2020-08-12

## 2020-08-12 VITALS
HEART RATE: 75 BPM | DIASTOLIC BLOOD PRESSURE: 60 MMHG | SYSTOLIC BLOOD PRESSURE: 126 MMHG | BODY MASS INDEX: 39.16 KG/M2 | WEIGHT: 221 LBS | HEIGHT: 63 IN | OXYGEN SATURATION: 96 %

## 2020-08-12 DIAGNOSIS — Z11.59 ENCOUNTER FOR SCREENING FOR OTHER VIRAL DISEASES: ICD-10-CM

## 2020-08-12 DIAGNOSIS — K21.9 GASTRO-ESOPHAGEAL REFLUX DISEASE W/OUT ESOPHAGITIS: ICD-10-CM

## 2020-08-12 DIAGNOSIS — Z87.09 PERSONAL HISTORY OF OTHER DISEASES OF THE RESPIRATORY SYSTEM: ICD-10-CM

## 2020-08-12 DIAGNOSIS — R06.2 WHEEZING: ICD-10-CM

## 2020-08-12 PROCEDURE — 99214 OFFICE O/P EST MOD 30 MIN: CPT

## 2020-08-12 RX ORDER — OMEPRAZOLE 40 MG/1
40 CAPSULE, DELAYED RELEASE ORAL DAILY
Refills: 0 | Status: DISCONTINUED | COMMUNITY
End: 2020-08-12

## 2020-08-12 RX ORDER — VIT A AND D3 IN COD LIVER OIL 1250-135
CAPSULE ORAL DAILY
Refills: 0 | Status: DISCONTINUED | COMMUNITY
End: 2020-08-12

## 2020-08-12 NOTE — REVIEW OF SYSTEMS
[Fatigue] : fatigue [Cough] : no cough [Dyspnea] : dyspnea [Pleuritic Pain] : no pleuritic pain [Wheezing] : no wheezing [SOB on Exertion] : sob on exertion [GERD] : gerd [Arthralgias] : arthralgias [Negative] : Endocrine

## 2020-08-12 NOTE — PHYSICAL EXAM
[No Acute Distress] : no acute distress [Normal Oropharynx] : normal oropharynx [Normal Appearance] : normal appearance [No Neck Mass] : no neck mass [Normal Rate/Rhythm] : normal rate/rhythm [Normal S1, S2] : normal s1, s2 [No Murmurs] : no murmurs [No Resp Distress] : no resp distress [No Acc Muscle Use] : no acc muscle use [Normal Rhythm and Effort] : normal rhythm and effort [Clear to Auscultation Bilaterally] : clear to auscultation bilaterally [No Abnormalities] : no abnormalities [Benign] : benign [No Clubbing] : no clubbing [No Cyanosis] : no cyanosis [No Edema] : no edema [FROM] : FROM [Normal Color/ Pigmentation] : normal color/ pigmentation [No Focal Deficits] : no focal deficits [Oriented x3] : oriented x3 [Normal Affect] : normal affect [TextBox_99] : Uses cane and somewhat of a limp

## 2020-08-13 ENCOUNTER — RESULT CHARGE (OUTPATIENT)
Age: 80
End: 2020-08-13

## 2020-08-13 ENCOUNTER — APPOINTMENT (OUTPATIENT)
Dept: ENDOCRINOLOGY | Facility: CLINIC | Age: 80
End: 2020-08-13
Payer: MEDICARE

## 2020-08-13 VITALS
WEIGHT: 221 LBS | OXYGEN SATURATION: 98 % | HEIGHT: 63 IN | SYSTOLIC BLOOD PRESSURE: 124 MMHG | BODY MASS INDEX: 39.16 KG/M2 | HEART RATE: 76 BPM | DIASTOLIC BLOOD PRESSURE: 80 MMHG

## 2020-08-13 DIAGNOSIS — E11.9 TYPE 2 DIABETES MELLITUS W/OUT COMPLICATIONS: ICD-10-CM

## 2020-08-13 DIAGNOSIS — E27.9 DISORDER OF ADRENAL GLAND, UNSPECIFIED: ICD-10-CM

## 2020-08-13 LAB — GLUCOSE BLDC GLUCOMTR-MCNC: 105

## 2020-08-13 PROCEDURE — 82962 GLUCOSE BLOOD TEST: CPT

## 2020-08-13 PROCEDURE — 99213 OFFICE O/P EST LOW 20 MIN: CPT | Mod: 25

## 2020-08-13 NOTE — REVIEW OF SYSTEMS
[Fatigue] : fatigue [SOB on Exertion] : shortness of breath on exertion [Shortness Of Breath] : shortness of breath [Constipation] : constipation [Stress] : stress [Anxiety] : anxiety [Recent Weight Loss (___ Lbs)] : recent weight loss: [unfilled] lbs [Recent Weight Gain (___ Lbs)] : no recent weight gain [Palpitations] : no palpitations [Chest Pain] : no chest pain [Vomiting] : no vomiting [Nausea] : no nausea [Abdominal Pain] : no abdominal pain [Polyuria] : no polyuria [Nocturia] : no nocturia [Myalgia] : no myalgia  [Joint Pain] : no joint pain [Polydipsia] : no polydipsia [de-identified] : due to current pandemic

## 2020-08-13 NOTE — REASON FOR VISIT
[Follow - Up] : a follow-up visit [DM Type 2] : DM Type 2 [Adrenal Evaluation/Adrenal Disorder] : adrenal evaluation/adrenal disorder

## 2020-08-13 NOTE — HISTORY OF PRESENT ILLNESS
[FreeTextEntry1] : Interval hx - c/o worsening SOB and dizziness, saw Pulmonary trial Symbicort, PFT next month. worseing acid reflux., not eating well due to reflux - scared to eat.  now using Bleach to disinfect surfaces in house\par \par T2DM DX 2018 on blood test 2 hbA1c >6.5\par SMBG - testing 1x daily, per pt feeling woozy and FS 72, this am FS 71\par Diet - trying to watch diet, no fried foods\par DM meds:  mg 1 tab in am and 2 tabs in evening (she did not stop as discussed at last visit\par Exercise - limited\par \par old records from Dr August - adrenal adenoma not hormonally active. MRI 3/2015 and 2018 stable left adrenal adenoma\par she denies episodic symptoms of headache, palpitations and sweating. she does not have high blood pressure\par \par

## 2020-08-13 NOTE — ASSESSMENT
[FreeTextEntry1] : 1. asymptomatic, stable Left adrenal nodule -h/o suppressed renin level but, no h/o HTN and potassium normal. most recent blood  test PAC/PRA 2.5. normal plasma metanephrines, normal late evening salivary cortisol levels\par - pt request repeat hormonal eval for dizziness, will check plasma metanephrines\par \par 2. T2DM - 2 A1c >6.5, noo A1c 6%\par - decrease MFN to 500 mg 1 tab daily\par - cont diet\par - repeat labs 3 months\par \par f/u GI for worsening reflux\par f/u pulmonary for SOB

## 2020-08-13 NOTE — PHYSICAL EXAM
[Alert] : alert [Well Nourished] : well nourished [No Acute Distress] : no acute distress [Healthy Appearance] : healthy appearance [No Accessory Muscle Use] : no accessory muscle use [EOMI] : extra ocular movement intact [Normal S1, S2] : normal S1 and S2 [Clear to Auscultation] : lungs were clear to auscultation bilaterally [Normal Rate] : heart rate was normal [Normal Bowel Sounds] : normal bowel sounds [Soft] : abdomen soft [Cranial Nerves Intact] : cranial nerves 2-12 were intact [Oriented x3] : oriented to person, place, and time [Normal Affect] : the affect was normal [Normal Insight/Judgement] : insight and judgment were intact [de-identified] : mild resp distress

## 2020-09-12 DIAGNOSIS — Z01.818 ENCOUNTER FOR OTHER PREPROCEDURAL EXAMINATION: ICD-10-CM

## 2020-09-14 ENCOUNTER — APPOINTMENT (OUTPATIENT)
Dept: DISASTER EMERGENCY | Facility: CLINIC | Age: 80
End: 2020-09-14

## 2020-09-14 ENCOUNTER — APPOINTMENT (OUTPATIENT)
Dept: PULMONOLOGY | Facility: CLINIC | Age: 80
End: 2020-09-14

## 2020-09-17 ENCOUNTER — APPOINTMENT (OUTPATIENT)
Dept: PULMONOLOGY | Facility: CLINIC | Age: 80
End: 2020-09-17

## 2020-10-05 ENCOUNTER — APPOINTMENT (OUTPATIENT)
Dept: DISASTER EMERGENCY | Facility: CLINIC | Age: 80
End: 2020-10-05

## 2020-10-06 LAB — SARS-COV-2 N GENE NPH QL NAA+PROBE: NOT DETECTED

## 2020-10-08 ENCOUNTER — OUTPATIENT (OUTPATIENT)
Dept: OUTPATIENT SERVICES | Facility: HOSPITAL | Age: 80
LOS: 1 days | Discharge: HOME | End: 2020-10-08
Payer: MEDICARE

## 2020-10-08 ENCOUNTER — RESULT REVIEW (OUTPATIENT)
Age: 80
End: 2020-10-08

## 2020-10-08 VITALS
HEART RATE: 64 BPM | RESPIRATION RATE: 20 BRPM | WEIGHT: 216.93 LBS | SYSTOLIC BLOOD PRESSURE: 177 MMHG | TEMPERATURE: 97 F | DIASTOLIC BLOOD PRESSURE: 79 MMHG | OXYGEN SATURATION: 100 % | HEIGHT: 68 IN

## 2020-10-08 DIAGNOSIS — Z98.890 OTHER SPECIFIED POSTPROCEDURAL STATES: Chronic | ICD-10-CM

## 2020-10-08 DIAGNOSIS — Z98.84 BARIATRIC SURGERY STATUS: Chronic | ICD-10-CM

## 2020-10-08 DIAGNOSIS — K13.5 ORAL SUBMUCOUS FIBROSIS: ICD-10-CM

## 2020-10-08 DIAGNOSIS — Z96.659 PRESENCE OF UNSPECIFIED ARTIFICIAL KNEE JOINT: Chronic | ICD-10-CM

## 2020-10-08 DIAGNOSIS — K21.9 GASTRO-ESOPHAGEAL REFLUX DISEASE WITHOUT ESOPHAGITIS: ICD-10-CM

## 2020-10-08 PROCEDURE — 88305 TISSUE EXAM BY PATHOLOGIST: CPT | Mod: 26

## 2020-10-08 PROCEDURE — 88305 TISSUE EXAM BY PATHOLOGIST: CPT

## 2020-10-08 PROCEDURE — 88313 SPECIAL STAINS GROUP 2: CPT

## 2020-10-08 PROCEDURE — 88313 SPECIAL STAINS GROUP 2: CPT | Mod: 26

## 2020-10-08 RX ORDER — METFORMIN HYDROCHLORIDE 850 MG/1
1 TABLET ORAL
Qty: 0 | Refills: 0 | DISCHARGE

## 2020-10-08 RX ORDER — ASCORBIC ACID 60 MG
0 TABLET,CHEWABLE ORAL
Qty: 0 | Refills: 0 | DISCHARGE

## 2020-10-08 RX ORDER — LATANOPROST 0.05 MG/ML
1 SOLUTION/ DROPS OPHTHALMIC; TOPICAL
Qty: 0 | Refills: 0 | DISCHARGE

## 2020-10-08 RX ORDER — ATORVASTATIN CALCIUM 80 MG/1
1 TABLET, FILM COATED ORAL
Qty: 0 | Refills: 0 | DISCHARGE

## 2020-10-08 RX ORDER — PYRIDOXINE HCL (VITAMIN B6) 100 MG
1 TABLET ORAL
Qty: 0 | Refills: 0 | DISCHARGE

## 2020-10-08 RX ORDER — PREGABALIN 225 MG/1
1 CAPSULE ORAL
Qty: 0 | Refills: 0 | DISCHARGE

## 2020-10-08 RX ORDER — FAMOTIDINE 10 MG/ML
1 INJECTION INTRAVENOUS
Qty: 0 | Refills: 0 | DISCHARGE

## 2020-10-08 NOTE — ASU PATIENT PROFILE, ADULT - PSH
Gastric bypass status for obesity    H/O bilateral breast reduction surgery    H/O myomectomy    H/O total knee replacement  B/L

## 2020-10-08 NOTE — ASU PATIENT PROFILE, ADULT - PMH
Adenoma    Arthritis    Asthma    Diabetes    GERD (gastroesophageal reflux disease)    Hyperplastic colon polyp    Peptic ulcer    PMB (postmenopausal bleeding)    Umbilical hernia

## 2020-10-13 DIAGNOSIS — K57.30 DIVERTICULOSIS OF LARGE INTESTINE WITHOUT PERFORATION OR ABSCESS WITHOUT BLEEDING: ICD-10-CM

## 2020-10-13 DIAGNOSIS — Z91.041 RADIOGRAPHIC DYE ALLERGY STATUS: ICD-10-CM

## 2020-10-13 DIAGNOSIS — Z12.11 ENCOUNTER FOR SCREENING FOR MALIGNANT NEOPLASM OF COLON: ICD-10-CM

## 2020-10-13 DIAGNOSIS — Z80.0 FAMILY HISTORY OF MALIGNANT NEOPLASM OF DIGESTIVE ORGANS: ICD-10-CM

## 2020-10-13 DIAGNOSIS — Z86.018 PERSONAL HISTORY OF OTHER BENIGN NEOPLASM: ICD-10-CM

## 2020-10-13 DIAGNOSIS — K44.9 DIAPHRAGMATIC HERNIA WITHOUT OBSTRUCTION OR GANGRENE: ICD-10-CM

## 2020-10-13 DIAGNOSIS — E11.40 TYPE 2 DIABETES MELLITUS WITH DIABETIC NEUROPATHY, UNSPECIFIED: ICD-10-CM

## 2020-10-13 DIAGNOSIS — R12 HEARTBURN: ICD-10-CM

## 2020-10-13 DIAGNOSIS — G47.33 OBSTRUCTIVE SLEEP APNEA (ADULT) (PEDIATRIC): ICD-10-CM

## 2020-10-13 DIAGNOSIS — Z91.013 ALLERGY TO SEAFOOD: ICD-10-CM

## 2020-10-13 DIAGNOSIS — J45.909 UNSPECIFIED ASTHMA, UNCOMPLICATED: ICD-10-CM

## 2020-10-13 DIAGNOSIS — K21.00 GASTRO-ESOPHAGEAL REFLUX DISEASE WITH ESOPHAGITIS, WITHOUT BLEEDING: ICD-10-CM

## 2020-10-13 DIAGNOSIS — Z86.010 PERSONAL HISTORY OF COLONIC POLYPS: ICD-10-CM

## 2020-10-13 DIAGNOSIS — K64.8 OTHER HEMORRHOIDS: ICD-10-CM

## 2020-11-06 ENCOUNTER — RX RENEWAL (OUTPATIENT)
Age: 80
End: 2020-11-06

## 2020-11-19 ENCOUNTER — APPOINTMENT (OUTPATIENT)
Dept: ENDOCRINOLOGY | Facility: CLINIC | Age: 80
End: 2020-11-19

## 2021-07-16 PROBLEM — N95.0 POSTMENOPAUSAL BLEEDING: Chronic | Status: ACTIVE | Noted: 2020-10-08

## 2021-07-16 PROBLEM — K42.9 UMBILICAL HERNIA WITHOUT OBSTRUCTION OR GANGRENE: Chronic | Status: ACTIVE | Noted: 2020-10-08

## 2021-07-16 PROBLEM — K21.9 GASTRO-ESOPHAGEAL REFLUX DISEASE WITHOUT ESOPHAGITIS: Chronic | Status: ACTIVE | Noted: 2020-10-08

## 2021-07-16 PROBLEM — K63.5 POLYP OF COLON: Chronic | Status: ACTIVE | Noted: 2020-10-08

## 2021-07-16 PROBLEM — E11.9 TYPE 2 DIABETES MELLITUS WITHOUT COMPLICATIONS: Chronic | Status: ACTIVE | Noted: 2020-10-08

## 2021-07-16 PROBLEM — K27.9 PEPTIC ULCER, SITE UNSPECIFIED, UNSPECIFIED AS ACUTE OR CHRONIC, WITHOUT HEMORRHAGE OR PERFORATION: Chronic | Status: ACTIVE | Noted: 2020-10-08

## 2021-07-16 PROBLEM — D36.9 BENIGN NEOPLASM, UNSPECIFIED SITE: Chronic | Status: ACTIVE | Noted: 2020-10-08

## 2021-07-28 ENCOUNTER — APPOINTMENT (OUTPATIENT)
Dept: PULMONOLOGY | Facility: CLINIC | Age: 81
End: 2021-07-28
Payer: MEDICARE

## 2021-07-28 VITALS
HEART RATE: 80 BPM | WEIGHT: 224 LBS | OXYGEN SATURATION: 97 % | SYSTOLIC BLOOD PRESSURE: 112 MMHG | DIASTOLIC BLOOD PRESSURE: 80 MMHG | RESPIRATION RATE: 18 BRPM | BODY MASS INDEX: 39.68 KG/M2

## 2021-07-28 DIAGNOSIS — Z23 ENCOUNTER FOR IMMUNIZATION: ICD-10-CM

## 2021-07-28 DIAGNOSIS — E66.01 MORBID (SEVERE) OBESITY DUE TO EXCESS CALORIES: ICD-10-CM

## 2021-07-28 DIAGNOSIS — R05 COUGH: ICD-10-CM

## 2021-07-28 DIAGNOSIS — R06.02 SHORTNESS OF BREATH: ICD-10-CM

## 2021-07-28 PROCEDURE — 99213 OFFICE O/P EST LOW 20 MIN: CPT

## 2021-08-31 DIAGNOSIS — Z01.811 ENCOUNTER FOR PREPROCEDURAL RESPIRATORY EXAMINATION: ICD-10-CM

## 2021-09-12 ENCOUNTER — APPOINTMENT (OUTPATIENT)
Dept: DISASTER EMERGENCY | Facility: CLINIC | Age: 81
End: 2021-09-12

## 2021-09-13 LAB — SARS-COV-2 N GENE NPH QL NAA+PROBE: NOT DETECTED

## 2021-09-15 ENCOUNTER — APPOINTMENT (OUTPATIENT)
Dept: PULMONOLOGY | Facility: CLINIC | Age: 81
End: 2021-09-15

## 2021-09-15 RX ORDER — METFORMIN HYDROCHLORIDE 500 MG/1
500 TABLET, COATED ORAL
Qty: 270 | Refills: 1 | Status: ACTIVE | COMMUNITY
Start: 2019-02-13

## 2021-09-15 RX ORDER — BLOOD SUGAR DIAGNOSTIC
STRIP MISCELLANEOUS
Qty: 100 | Refills: 0 | Status: ACTIVE | COMMUNITY
Start: 2020-06-01

## 2022-01-18 ENCOUNTER — APPOINTMENT (OUTPATIENT)
Dept: ORTHOPEDIC SURGERY | Facility: CLINIC | Age: 82
End: 2022-01-18
Payer: MEDICARE

## 2022-01-18 ENCOUNTER — NON-APPOINTMENT (OUTPATIENT)
Age: 82
End: 2022-01-18

## 2022-01-18 VITALS — DIASTOLIC BLOOD PRESSURE: 66 MMHG | SYSTOLIC BLOOD PRESSURE: 102 MMHG | HEART RATE: 97 BPM

## 2022-01-18 DIAGNOSIS — M25.579 PAIN IN UNSPECIFIED ANKLE AND JOINTS OF UNSPECIFIED FOOT: ICD-10-CM

## 2022-01-18 DIAGNOSIS — M19.072 PRIMARY OSTEOARTHRITIS, LEFT ANKLE AND FOOT: ICD-10-CM

## 2022-01-18 PROCEDURE — 99203 OFFICE O/P NEW LOW 30 MIN: CPT | Mod: 25

## 2022-01-18 PROCEDURE — 73610 X-RAY EXAM OF ANKLE: CPT | Mod: LT

## 2022-01-18 PROCEDURE — 20605 DRAIN/INJ JOINT/BURSA W/O US: CPT | Mod: LT

## 2022-01-18 PROCEDURE — 20610 DRAIN/INJ JOINT/BURSA W/O US: CPT | Mod: LT

## 2022-01-18 RX ORDER — CIPROFLOXACIN HYDROCHLORIDE 250 MG/1
250 TABLET, FILM COATED ORAL
Qty: 14 | Refills: 0 | Status: ACTIVE | COMMUNITY
Start: 2021-10-12

## 2022-01-18 RX ORDER — LACTOBACILLUS ACIDOPHILUS/PECT 30 MG-20MG
TABLET ORAL
Refills: 0 | Status: ACTIVE | COMMUNITY

## 2022-01-18 RX ORDER — PHENTERMINE HYDROCHLORIDE 30 MG/1
30 CAPSULE ORAL
Qty: 30 | Refills: 0 | Status: ACTIVE | COMMUNITY
Start: 2021-11-22

## 2022-01-18 RX ORDER — CHOLECALCIFEROL (VITAMIN D3) 25 MCG
TABLET ORAL
Refills: 0 | Status: ACTIVE | COMMUNITY

## 2022-01-18 RX ORDER — PREDNISONE 50 MG/1
50 TABLET ORAL
Qty: 3 | Refills: 0 | Status: ACTIVE | COMMUNITY
Start: 2021-10-27

## 2022-01-18 RX ORDER — PSYLLIUM HUSK 0.4 G
CAPSULE ORAL
Refills: 0 | Status: ACTIVE | COMMUNITY

## 2022-01-18 RX ORDER — ATORVASTATIN CALCIUM 10 MG/1
10 TABLET, FILM COATED ORAL
Qty: 90 | Refills: 0 | Status: ACTIVE | COMMUNITY
Start: 2021-08-18

## 2022-01-18 RX ORDER — BACILLUS COAGULANS/INULIN 1B-250 MG
CAPSULE ORAL
Refills: 0 | Status: ACTIVE | COMMUNITY

## 2022-01-18 RX ORDER — MECLIZINE HYDROCHLORIDE 25 MG/1
25 TABLET ORAL
Qty: 60 | Refills: 0 | Status: ACTIVE | COMMUNITY
Start: 2021-11-22

## 2022-01-18 RX ORDER — AMLODIPINE BESYLATE 2.5 MG/1
2.5 TABLET ORAL
Qty: 30 | Refills: 0 | Status: ACTIVE | COMMUNITY
Start: 2021-10-28

## 2022-01-18 RX ORDER — CHROMIUM 200 MCG
TABLET ORAL
Refills: 0 | Status: ACTIVE | COMMUNITY

## 2022-01-18 RX ORDER — PNV NO.95/FERROUS FUM/FOLIC AC 28MG-0.8MG
TABLET ORAL
Refills: 0 | Status: ACTIVE | COMMUNITY

## 2022-01-18 RX ORDER — OMEPRAZOLE 40 MG/1
40 CAPSULE, DELAYED RELEASE ORAL
Refills: 0 | Status: ACTIVE | COMMUNITY

## 2022-01-18 RX ORDER — ASCORBIC ACID 500 MG
TABLET ORAL
Refills: 0 | Status: ACTIVE | COMMUNITY

## 2022-01-18 RX ADMIN — Medication 1 %: at 00:00

## 2022-01-18 RX ADMIN — METHYLPREDNISOLONE ACETATE 0.5 MG/ML: 80 INJECTION, SUSPENSION INTRA-ARTICULAR; INTRALESIONAL; INTRAMUSCULAR; SOFT TISSUE at 00:00

## 2022-01-21 RX ORDER — METHYLPRED ACET/NACL,ISO-OS/PF 80 MG/ML
80 VIAL (ML) INJECTION
Qty: 1 | Refills: 0 | Status: COMPLETED | OUTPATIENT
Start: 2022-01-18

## 2022-01-21 RX ORDER — LIDOCAINE HYDROCHLORIDE 10 MG/ML
1 INJECTION, SOLUTION INFILTRATION; PERINEURAL
Refills: 0 | Status: COMPLETED | OUTPATIENT
Start: 2022-01-18

## 2024-04-19 ENCOUNTER — APPOINTMENT (OUTPATIENT)
Dept: ORTHOPEDIC SURGERY | Facility: CLINIC | Age: 84
End: 2024-04-19
Payer: MEDICARE

## 2024-04-19 VITALS
BODY MASS INDEX: 38.27 KG/M2 | HEIGHT: 63 IN | WEIGHT: 216 LBS | DIASTOLIC BLOOD PRESSURE: 78 MMHG | SYSTOLIC BLOOD PRESSURE: 142 MMHG | HEART RATE: 73 BPM

## 2024-04-19 DIAGNOSIS — M25.572 PAIN IN LEFT ANKLE AND JOINTS OF LEFT FOOT: ICD-10-CM

## 2024-04-19 DIAGNOSIS — M17.0 BILATERAL PRIMARY OSTEOARTHRITIS OF KNEE: ICD-10-CM

## 2024-04-19 PROCEDURE — 99214 OFFICE O/P EST MOD 30 MIN: CPT | Mod: 25

## 2024-04-19 PROCEDURE — 20605 DRAIN/INJ JOINT/BURSA W/O US: CPT | Mod: LT

## 2024-04-19 PROCEDURE — 73610 X-RAY EXAM OF ANKLE: CPT | Mod: 50

## 2024-04-19 PROCEDURE — 73562 X-RAY EXAM OF KNEE 3: CPT | Mod: 50
